# Patient Record
Sex: MALE | Race: BLACK OR AFRICAN AMERICAN | NOT HISPANIC OR LATINO | URBAN - METROPOLITAN AREA
[De-identification: names, ages, dates, MRNs, and addresses within clinical notes are randomized per-mention and may not be internally consistent; named-entity substitution may affect disease eponyms.]

---

## 2018-05-04 ENCOUNTER — EMERGENCY (EMERGENCY)
Facility: HOSPITAL | Age: 68
LOS: 1 days | End: 2018-05-04
Attending: EMERGENCY MEDICINE | Admitting: EMERGENCY MEDICINE
Payer: MEDICARE

## 2018-05-04 VITALS
SYSTOLIC BLOOD PRESSURE: 143 MMHG | OXYGEN SATURATION: 99 % | TEMPERATURE: 98 F | RESPIRATION RATE: 17 BRPM | HEART RATE: 77 BPM | WEIGHT: 160.06 LBS | DIASTOLIC BLOOD PRESSURE: 80 MMHG | HEIGHT: 73 IN

## 2018-05-04 DIAGNOSIS — Z98.890 OTHER SPECIFIED POSTPROCEDURAL STATES: Chronic | ICD-10-CM

## 2018-05-04 LAB
ALBUMIN SERPL ELPH-MCNC: 4.5 G/DL — SIGNIFICANT CHANGE UP (ref 3.3–5)
ALP SERPL-CCNC: 49 U/L — SIGNIFICANT CHANGE UP (ref 40–120)
ALT FLD-CCNC: 18 U/L — SIGNIFICANT CHANGE UP (ref 10–45)
ANION GAP SERPL CALC-SCNC: 13 MMOL/L — SIGNIFICANT CHANGE UP (ref 5–17)
AST SERPL-CCNC: 17 U/L — SIGNIFICANT CHANGE UP (ref 10–40)
BASOPHILS # BLD AUTO: 0.1 K/UL — SIGNIFICANT CHANGE UP (ref 0–0.2)
BASOPHILS NFR BLD AUTO: 1 % — SIGNIFICANT CHANGE UP (ref 0–2)
BILIRUB SERPL-MCNC: 0.6 MG/DL — SIGNIFICANT CHANGE UP (ref 0.2–1.2)
BUN SERPL-MCNC: 14 MG/DL — SIGNIFICANT CHANGE UP (ref 7–23)
CALCIUM SERPL-MCNC: 9.6 MG/DL — SIGNIFICANT CHANGE UP (ref 8.4–10.5)
CHLORIDE SERPL-SCNC: 101 MMOL/L — SIGNIFICANT CHANGE UP (ref 96–108)
CK MB BLD-MCNC: 1.1 % — SIGNIFICANT CHANGE UP (ref 0–3.5)
CK MB CFR SERPL CALC: 1.2 NG/ML — SIGNIFICANT CHANGE UP (ref 0–6.7)
CK SERPL-CCNC: 109 U/L — SIGNIFICANT CHANGE UP (ref 30–200)
CO2 SERPL-SCNC: 27 MMOL/L — SIGNIFICANT CHANGE UP (ref 22–31)
CREAT SERPL-MCNC: 0.98 MG/DL — SIGNIFICANT CHANGE UP (ref 0.5–1.3)
EOSINOPHIL # BLD AUTO: 0.3 K/UL — SIGNIFICANT CHANGE UP (ref 0–0.5)
EOSINOPHIL NFR BLD AUTO: 3.5 % — SIGNIFICANT CHANGE UP (ref 0–6)
GLUCOSE SERPL-MCNC: 90 MG/DL — SIGNIFICANT CHANGE UP (ref 70–99)
HCT VFR BLD CALC: 40.7 % — SIGNIFICANT CHANGE UP (ref 39–50)
HGB BLD-MCNC: 13.4 G/DL — SIGNIFICANT CHANGE UP (ref 13–17)
LYMPHOCYTES # BLD AUTO: 2.9 K/UL — SIGNIFICANT CHANGE UP (ref 1–3.3)
LYMPHOCYTES # BLD AUTO: 38.7 % — SIGNIFICANT CHANGE UP (ref 13–44)
MCHC RBC-ENTMCNC: 29 PG — SIGNIFICANT CHANGE UP (ref 27–34)
MCHC RBC-ENTMCNC: 33 GM/DL — SIGNIFICANT CHANGE UP (ref 32–36)
MCV RBC AUTO: 88 FL — SIGNIFICANT CHANGE UP (ref 80–100)
MONOCYTES # BLD AUTO: 0.5 K/UL — SIGNIFICANT CHANGE UP (ref 0–0.9)
MONOCYTES NFR BLD AUTO: 7.3 % — SIGNIFICANT CHANGE UP (ref 2–14)
NEUTROPHILS # BLD AUTO: 3.7 K/UL — SIGNIFICANT CHANGE UP (ref 1.8–7.4)
NEUTROPHILS NFR BLD AUTO: 49.5 % — SIGNIFICANT CHANGE UP (ref 43–77)
PLATELET # BLD AUTO: 213 K/UL — SIGNIFICANT CHANGE UP (ref 150–400)
POTASSIUM SERPL-MCNC: 3.9 MMOL/L — SIGNIFICANT CHANGE UP (ref 3.5–5.3)
POTASSIUM SERPL-SCNC: 3.9 MMOL/L — SIGNIFICANT CHANGE UP (ref 3.5–5.3)
PROT SERPL-MCNC: 7.9 G/DL — SIGNIFICANT CHANGE UP (ref 6–8.3)
RBC # BLD: 4.63 M/UL — SIGNIFICANT CHANGE UP (ref 4.2–5.8)
RBC # FLD: 12.5 % — SIGNIFICANT CHANGE UP (ref 10.3–14.5)
SODIUM SERPL-SCNC: 141 MMOL/L — SIGNIFICANT CHANGE UP (ref 135–145)
TROPONIN T SERPL-MCNC: <0.01 NG/ML — SIGNIFICANT CHANGE UP (ref 0–0.06)
TROPONIN T SERPL-MCNC: <0.01 NG/ML — SIGNIFICANT CHANGE UP (ref 0–0.06)
WBC # BLD: 7.4 K/UL — SIGNIFICANT CHANGE UP (ref 3.8–10.5)
WBC # FLD AUTO: 7.4 K/UL — SIGNIFICANT CHANGE UP (ref 3.8–10.5)

## 2018-05-04 PROCEDURE — 73564 X-RAY EXAM KNEE 4 OR MORE: CPT | Mod: 26,RT

## 2018-05-04 PROCEDURE — 70450 CT HEAD/BRAIN W/O DYE: CPT | Mod: 26

## 2018-05-04 PROCEDURE — 99220: CPT

## 2018-05-04 PROCEDURE — 93010 ELECTROCARDIOGRAM REPORT: CPT

## 2018-05-04 PROCEDURE — 71045 X-RAY EXAM CHEST 1 VIEW: CPT | Mod: 26

## 2018-05-04 NOTE — ED CDU PROVIDER INITIAL DAY NOTE - ATTENDING CONTRIBUTION TO CARE
Attending MD Busby:   I personally have seen and examined this patient.  Physician assistant note reviewed and agree on plan of care and except where noted.  See HPI, PE, and MDM for details.

## 2018-05-04 NOTE — ED PROVIDER NOTE - PROGRESS NOTE DETAILS
Endorsed to Dr Kehinde Stokes MD, Facep Attending MD Busby: CT head wnl, first CE negative. Will place in CDU for tele monitoring, TTE

## 2018-05-04 NOTE — ED PROVIDER NOTE - MUSCULOSKELETAL, MLM
Spine appears normal, range of motion is not limited.  Right knee tender to palpation. No large effusion noted.

## 2018-05-04 NOTE — ED PROVIDER NOTE - ATTENDING CONTRIBUTION TO CARE
Private Physician [facility]  67y male pmh borderline DM,GERD, HLD, No habits, Hypertension,cancer,travel, Sp hernia repair,. shoulder surg left. Retired [employer] worker comes to ed was sitting outside on warm day with onset of nausea and syncope. Had hx of syncope two years ago. Last strees test more than 10y ago. Pt struck occiput and knee when fell. Unwittnessed. now complains of pain head and rt knee pain. No vomiting. No fever chills cp/sob/palps/diplopia  PE WDWN male normocephalic, abrasoin forhead, neck neg by  nexus, cv no rubs, gallops or murmurs, neuro gcs 15 speech fluent power 5,.5 all extr pain light touch intact  German Stokes MD, Facep

## 2018-05-04 NOTE — ED PROVIDER NOTE - MEDICAL DECISION MAKING DETAILS
Syncopal episode in male.  Ddx include vasovagal episode, orthostasis, arrythmia.  Suspect vasovagal given prodrome.  Less likely arrythmia since no known cardiac disease.  Plan for CBC, CMP, CE.  Will do CT head since pt is >65 and on anticoagulant and check knee X ray to rule out fractures.

## 2018-05-04 NOTE — ED CDU PROVIDER INITIAL DAY NOTE - PROGRESS NOTE DETAILS
Pt comfortable. No complaints. Vital signs stable. Will continue to observe and reassess. -Dina Evans PA-C Received pt at signout at 1900 from [place] . Case/plan reviewed. second troponin negative, ekg NSR with no YAQUELIN or depressions. will continue to monitor.

## 2018-05-04 NOTE — ED ADULT TRIAGE NOTE - CHIEF COMPLAINT QUOTE
Pt states that he had a syncopal episode yesterday s/p " sitting in the sun". C/o r knee pain and head abrasions

## 2018-05-04 NOTE — ED ADULT NURSE NOTE - OBJECTIVE STATEMENT
67y m pt c/o syncope yesterday; was sitting on stoop in hot sun; not sure what had eaten earlier in the day except some ice cream; has had another syncopal episode a "while" ago; pt is non compliant with prescribed medications; pt states "doesn't like to take medication"; aox3; poor historian; no resp distress; no chest pain; no abd pain; no n/v/d; no fever/chills; no cough/congestion; no numbness/tingling; pt has small shira size wound to front of head and smaller above eyebrow; pt states fell 4 steps; and also hurt his right knee; states is painful and swollen; tender on palpation; iv placed; labs drawn per md orders; safety/comfort maintained

## 2018-05-04 NOTE — ED CDU PROVIDER INITIAL DAY NOTE - OBJECTIVE STATEMENT
67M hx pre-T2DM, HLD presenting with unwitnessed syncopal episode at home yesterday.  Pt was sitting on his porch steps yesterday when he began to feel lightheaded and nausea and then passed out.  He fell down 4 steps with impact to his head and his right knee.  Pt decided to delay coming to ER because 'he wanted to see how he felt' but came today due to headache and persistent knee pain.  He sustained an abrasion to the front of his scalp but no other injuries.  He is currently on ASA for primary prevention but says he is non-compliant with the med.  His last episode of syncope occurred 2 years ago after getting out of a truck with similar prodromal symptoms preceding the event. 67M hx pre-T2DM, HLD presenting with unwitnessed syncopal episode at home yesterday.  Pt was sitting on his porch steps yesterday when he began to feel lightheaded and nausea and then passed out.  He fell down 4 steps with impact to his head and his right knee.  Pt decided to delay coming to ER because 'he wanted to see how he felt' but came today due to headache and persistent knee pain.  He sustained an abrasion to the front of his scalp but no other injuries.  He is currently on ASA for primary prevention but says he is non-compliant with his medications.  His last episode of syncope occurred 2 years ago after getting out of a truck with similar prodromal symptoms preceding the event.

## 2018-05-04 NOTE — ED PROVIDER NOTE - OBJECTIVE STATEMENT
67M hx pre-T2DM, HLD presenting with unwitnessed syncopal episode at home yesterday.  Pt was sitting on his porch steps yesterday when he began to feel lightheaded and nausea and then passed out.  He fell down 4 steps with impact to his head and his right knee.  Pt decided to delay coming to ER because 'he wanted to see how he felt' but came today due to headache and persistent knee pain.  He sustained an abrasion to the front of his scalp but no other injuries.  He is currently on ASA for primary prevention but says he is non-compliant with the med.  His last episode of syncope occurred 2 years ago after getting out of a truck with similar prodromal symptoms preceding the event.

## 2018-05-05 VITALS
RESPIRATION RATE: 16 BRPM | TEMPERATURE: 98 F | OXYGEN SATURATION: 98 % | SYSTOLIC BLOOD PRESSURE: 130 MMHG | DIASTOLIC BLOOD PRESSURE: 73 MMHG | HEART RATE: 61 BPM

## 2018-05-05 LAB
CHOLEST SERPL-MCNC: 208 MG/DL — HIGH (ref 10–199)
HBA1C BLD-MCNC: 6.7 % — HIGH (ref 4–5.6)
HDLC SERPL-MCNC: 51 MG/DL — SIGNIFICANT CHANGE UP (ref 40–125)
LIPID PNL WITH DIRECT LDL SERPL: 139 MG/DL — HIGH
TOTAL CHOLESTEROL/HDL RATIO MEASUREMENT: 4.1 RATIO — SIGNIFICANT CHANGE UP (ref 3.4–9.6)
TRIGL SERPL-MCNC: 92 MG/DL — SIGNIFICANT CHANGE UP (ref 10–149)

## 2018-05-05 PROCEDURE — 73564 X-RAY EXAM KNEE 4 OR MORE: CPT

## 2018-05-05 PROCEDURE — 85027 COMPLETE CBC AUTOMATED: CPT

## 2018-05-05 PROCEDURE — 80053 COMPREHEN METABOLIC PANEL: CPT

## 2018-05-05 PROCEDURE — 82550 ASSAY OF CK (CPK): CPT

## 2018-05-05 PROCEDURE — 82962 GLUCOSE BLOOD TEST: CPT

## 2018-05-05 PROCEDURE — 93306 TTE W/DOPPLER COMPLETE: CPT | Mod: 26

## 2018-05-05 PROCEDURE — 83036 HEMOGLOBIN GLYCOSYLATED A1C: CPT

## 2018-05-05 PROCEDURE — 82553 CREATINE MB FRACTION: CPT

## 2018-05-05 PROCEDURE — 93005 ELECTROCARDIOGRAM TRACING: CPT

## 2018-05-05 PROCEDURE — 80061 LIPID PANEL: CPT

## 2018-05-05 PROCEDURE — 71045 X-RAY EXAM CHEST 1 VIEW: CPT

## 2018-05-05 PROCEDURE — 99217: CPT

## 2018-05-05 PROCEDURE — 93306 TTE W/DOPPLER COMPLETE: CPT

## 2018-05-05 PROCEDURE — 84484 ASSAY OF TROPONIN QUANT: CPT

## 2018-05-05 PROCEDURE — 70450 CT HEAD/BRAIN W/O DYE: CPT

## 2018-05-05 PROCEDURE — 99284 EMERGENCY DEPT VISIT MOD MDM: CPT | Mod: 25

## 2018-05-05 PROCEDURE — G0378: CPT

## 2018-05-05 NOTE — ED CDU PROVIDER DISPOSITION NOTE - ATTENDING CONTRIBUTION TO CARE
Attending MD Mercado:   I personally have seen and examined this patient.  Physician assistant note reviewed and agree on plan of care and except where noted.  See below for details.     Attending MD Mercado: I have personally performed a face to face diagnostic evaluation on this patient.  I have reviewed the ACP note and agree with the history, exam, and plan of care, except as noted.      67M with PMH including HLD, DM sent to the CDU for echo s/p syncopal episode while sitting on his porch and presenting with R knee pain.  Reports knee pain improved although not resolved.  Denies chest pain, shortness of breath, palpitations. Denies abdominal pain, nausea, vomiting, diarrhea, blood in stools. Denies loss of urinary or bowel continence. Denies numbness/weakness/tingling in extremities.  On exam, NAD, head NCAT, PERRL, FROM at neck, no tenderness to palpation or stepoffs along length of spine, lungs CTAB with good inspiratory effort, +S1S2, no m/r/g, abdomen soft with +BS, NT, ND, no CVAT, moving all extremities with 5/5 strength bilateral upper and lower extremities, good and equal  strength bilaterally, sensory grossly intact; A/P: 67M with syncopal episode, ECHO reviewed, no acute pathology noted.  No acute issues at  this time.  Lab and radiology tests reviewed with patient.  Patient stable for discharge. Follow up instructions given, importance of follow up emphasized, return to ED parameters reviewed and patient verbalized understanding.  All questions answered, all concerns addressed.

## 2018-05-05 NOTE — ED CDU PROVIDER SUBSEQUENT DAY NOTE - ATTENDING CONTRIBUTION TO CARE
Attending MD Mercado:   I personally have seen and examined this patient.  Physician assistant note reviewed and agree on plan of care and except where noted.  See 930am progress note for details.

## 2018-05-05 NOTE — ED CDU PROVIDER DISPOSITION NOTE - CLINICAL COURSE
67M hx pre-T2DM, HLD presenting with unwitnessed syncopal episode at home yesterday.  Pt was sitting on his porch steps yesterday when he began to feel lightheaded and nausea and then passed out.  He fell down 4 steps with impact to his head and his right knee. Patient denies chest pain/sob/fever/chills/cough. reports he had come to the ED for persistent knee pain although has been ambulating without difficulty. Patient was placed in the CDU for telemetry, trending of cardiac enzymes and echo. Overnight the patient had no events on tele, walked comfortably around the unit without symptoms and vitals remained stable. EKG remained stable and CE x 2 were negative. Echo revealed ________________________ 67M hx pre-T2DM, HLD presenting with unwitnessed syncopal episode at home yesterday.  Pt was sitting on his porch steps yesterday when he began to feel lightheaded and nausea and then passed out.  He fell down 4 steps with impact to his head and his right knee. Patient denies chest pain/sob/fever/chills/cough. reports he had come to the ED for persistent knee pain although has been ambulating without difficulty. Patient was placed in the CDU for telemetry, trending of cardiac enzymes and echo. Overnight the patient had no events on tele, walked comfortably around the unit without symptoms and vitals remained stable. EKG remained stable and CE x 2 were negative. Echo revealed some aortic calcifications that are nonobstructive, normal left ventricular function and an EF of 80%. Patient has been asymptomatic and after evaluation with Dr. Mercado at bedside patient was discharged stable home with cardiology clinic follow-up and return precautions.

## 2018-05-05 NOTE — ED CDU PROVIDER SUBSEQUENT DAY NOTE - HISTORY
Patient sleeping. NAD. No complaints. VSS. CE negative x 2. no events on tele. will continue to monitor

## 2018-05-05 NOTE — ED ADULT NURSE REASSESSMENT NOTE - NS ED NURSE REASSESS COMMENT FT1
report taken from Monalisa MERIDA. pt awaiting echo
Received pt from MAGNOLIA Nunez , received pt alert and responsive, oriented x4, denies any respiratory distress, SOB, or difficulty breathing. Pt transferred to CDU for  lightheadedness with syncopal episode and nausea. Pt is currently asymptomatic at this time, Pt denies pain/discomfort at this time. Pending 2nd CE with EKG and ECHO in AM.  On tele SR on monitor hr: 70's.  IV in place, patent and free of signs of infiltration, pt denies chest pain or palpitations, V/S stable, pt afebrile, pt denies pain at this time. Pt educated on unit and unit rules, instructed patient to notify RN of any needed assistance, Pt verbalizes understanding, Call bell placed within reach. Safety maintained. Will continue to monitor.

## 2018-05-05 NOTE — ED CDU PROVIDER DISPOSITION NOTE - PLAN OF CARE
1. Rest. Hydrate. Continue medications as prescribed.   2. Follow up with your PMD in 2-3 days with copies of your results. You should also follow up with cardiology [phone]  3. Return to ER for new or worsened chest pain, difficulty breathing, palpitations, dizziness, drenching sweats, fever, passing out or any concern.  Rest your knee. Apply cold pack for 20 minutes multiple times daily. Elevate. Take Motrin 400-600mg every 6 hrs as needed for pain. Take with food  you can also Take Tylenol 650mg every 6 hrs as needed for pain.

## 2019-02-08 ENCOUNTER — EMERGENCY (EMERGENCY)
Facility: HOSPITAL | Age: 69
LOS: 1 days | Discharge: HOME | End: 2019-02-08
Attending: EMERGENCY MEDICINE
Payer: MEDICARE

## 2019-02-08 VITALS
TEMPERATURE: 98 F | HEART RATE: 82 BPM | SYSTOLIC BLOOD PRESSURE: 149 MMHG | HEIGHT: 70 IN | DIASTOLIC BLOOD PRESSURE: 79 MMHG | OXYGEN SATURATION: 98 % | WEIGHT: 169.98 LBS | RESPIRATION RATE: 18 BRPM

## 2019-02-08 VITALS
SYSTOLIC BLOOD PRESSURE: 133 MMHG | HEART RATE: 72 BPM | RESPIRATION RATE: 18 BRPM | TEMPERATURE: 98 F | OXYGEN SATURATION: 98 % | DIASTOLIC BLOOD PRESSURE: 84 MMHG

## 2019-02-08 DIAGNOSIS — Z98.890 OTHER SPECIFIED POSTPROCEDURAL STATES: Chronic | ICD-10-CM

## 2019-02-08 LAB
ALBUMIN SERPL ELPH-MCNC: 4.3 G/DL — SIGNIFICANT CHANGE UP (ref 3.3–5)
ALP SERPL-CCNC: 46 U/L — SIGNIFICANT CHANGE UP (ref 40–120)
ALT FLD-CCNC: 16 U/L — SIGNIFICANT CHANGE UP (ref 10–45)
ANION GAP SERPL CALC-SCNC: 12 MMOL/L — SIGNIFICANT CHANGE UP (ref 5–17)
AST SERPL-CCNC: 12 U/L — SIGNIFICANT CHANGE UP (ref 10–40)
BASOPHILS # BLD AUTO: 0 K/UL — SIGNIFICANT CHANGE UP (ref 0–0.2)
BASOPHILS NFR BLD AUTO: 0.8 % — SIGNIFICANT CHANGE UP (ref 0–2)
BILIRUB SERPL-MCNC: 0.4 MG/DL — SIGNIFICANT CHANGE UP (ref 0.2–1.2)
BUN SERPL-MCNC: 9 MG/DL — SIGNIFICANT CHANGE UP (ref 7–23)
CALCIUM SERPL-MCNC: 9.1 MG/DL — SIGNIFICANT CHANGE UP (ref 8.4–10.5)
CHLORIDE SERPL-SCNC: 99 MMOL/L — SIGNIFICANT CHANGE UP (ref 96–108)
CK SERPL-CCNC: 105 U/L — SIGNIFICANT CHANGE UP (ref 30–200)
CO2 SERPL-SCNC: 26 MMOL/L — SIGNIFICANT CHANGE UP (ref 22–31)
CREAT SERPL-MCNC: 0.96 MG/DL — SIGNIFICANT CHANGE UP (ref 0.5–1.3)
D DIMER BLD IA.RAPID-MCNC: 157 NG/ML DDU — SIGNIFICANT CHANGE UP
EOSINOPHIL # BLD AUTO: 0.3 K/UL — SIGNIFICANT CHANGE UP (ref 0–0.5)
EOSINOPHIL NFR BLD AUTO: 5 % — SIGNIFICANT CHANGE UP (ref 0–6)
GLUCOSE SERPL-MCNC: 91 MG/DL — SIGNIFICANT CHANGE UP (ref 70–99)
HCT VFR BLD CALC: 38.4 % — LOW (ref 39–50)
HGB BLD-MCNC: 13.5 G/DL — SIGNIFICANT CHANGE UP (ref 13–17)
LYMPHOCYTES # BLD AUTO: 2.1 K/UL — SIGNIFICANT CHANGE UP (ref 1–3.3)
LYMPHOCYTES # BLD AUTO: 33.1 % — SIGNIFICANT CHANGE UP (ref 13–44)
MCHC RBC-ENTMCNC: 30.1 PG — SIGNIFICANT CHANGE UP (ref 27–34)
MCHC RBC-ENTMCNC: 35.2 GM/DL — SIGNIFICANT CHANGE UP (ref 32–36)
MCV RBC AUTO: 85.5 FL — SIGNIFICANT CHANGE UP (ref 80–100)
MONOCYTES # BLD AUTO: 0.7 K/UL — SIGNIFICANT CHANGE UP (ref 0–0.9)
MONOCYTES NFR BLD AUTO: 10.4 % — SIGNIFICANT CHANGE UP (ref 2–14)
NEUTROPHILS # BLD AUTO: 3.2 K/UL — SIGNIFICANT CHANGE UP (ref 1.8–7.4)
NEUTROPHILS NFR BLD AUTO: 50.8 % — SIGNIFICANT CHANGE UP (ref 43–77)
NT-PROBNP SERPL-SCNC: 90 PG/ML — SIGNIFICANT CHANGE UP (ref 0–300)
PLATELET # BLD AUTO: 218 K/UL — SIGNIFICANT CHANGE UP (ref 150–400)
POTASSIUM SERPL-MCNC: 4.1 MMOL/L — SIGNIFICANT CHANGE UP (ref 3.5–5.3)
POTASSIUM SERPL-SCNC: 4.1 MMOL/L — SIGNIFICANT CHANGE UP (ref 3.5–5.3)
PROT SERPL-MCNC: 7.5 G/DL — SIGNIFICANT CHANGE UP (ref 6–8.3)
RAPID RVP RESULT: SIGNIFICANT CHANGE UP
RBC # BLD: 4.49 M/UL — SIGNIFICANT CHANGE UP (ref 4.2–5.8)
RBC # FLD: 12.7 % — SIGNIFICANT CHANGE UP (ref 10.3–14.5)
SODIUM SERPL-SCNC: 137 MMOL/L — SIGNIFICANT CHANGE UP (ref 135–145)
TROPONIN T, HIGH SENSITIVITY RESULT: <6 NG/L — SIGNIFICANT CHANGE UP (ref 0–51)
WBC # BLD: 6.3 K/UL — SIGNIFICANT CHANGE UP (ref 3.8–10.5)
WBC # FLD AUTO: 6.3 K/UL — SIGNIFICANT CHANGE UP (ref 3.8–10.5)

## 2019-02-08 PROCEDURE — 93010 ELECTROCARDIOGRAM REPORT: CPT

## 2019-02-08 PROCEDURE — 99284 EMERGENCY DEPT VISIT MOD MDM: CPT | Mod: 25

## 2019-02-08 PROCEDURE — 87486 CHLMYD PNEUM DNA AMP PROBE: CPT

## 2019-02-08 PROCEDURE — 99285 EMERGENCY DEPT VISIT HI MDM: CPT | Mod: 25

## 2019-02-08 PROCEDURE — 71046 X-RAY EXAM CHEST 2 VIEWS: CPT

## 2019-02-08 PROCEDURE — 80053 COMPREHEN METABOLIC PANEL: CPT

## 2019-02-08 PROCEDURE — 83880 ASSAY OF NATRIURETIC PEPTIDE: CPT

## 2019-02-08 PROCEDURE — 84484 ASSAY OF TROPONIN QUANT: CPT

## 2019-02-08 PROCEDURE — 87581 M.PNEUMON DNA AMP PROBE: CPT

## 2019-02-08 PROCEDURE — 71046 X-RAY EXAM CHEST 2 VIEWS: CPT | Mod: 26

## 2019-02-08 PROCEDURE — 87798 DETECT AGENT NOS DNA AMP: CPT

## 2019-02-08 PROCEDURE — 93005 ELECTROCARDIOGRAM TRACING: CPT

## 2019-02-08 PROCEDURE — 87633 RESP VIRUS 12-25 TARGETS: CPT

## 2019-02-08 PROCEDURE — 82550 ASSAY OF CK (CPK): CPT

## 2019-02-08 PROCEDURE — 85379 FIBRIN DEGRADATION QUANT: CPT

## 2019-02-08 PROCEDURE — 85027 COMPLETE CBC AUTOMATED: CPT

## 2019-02-08 RX ORDER — ASPIRIN/CALCIUM CARB/MAGNESIUM 324 MG
324 TABLET ORAL DAILY
Qty: 0 | Refills: 0 | Status: DISCONTINUED | OUTPATIENT
Start: 2019-02-08 | End: 2019-02-12

## 2019-02-08 RX ADMIN — Medication 324 MILLIGRAM(S): at 18:08

## 2019-02-08 NOTE — ED PROVIDER NOTE - NSFOLLOWUPCLINICS_GEN_ALL_ED_FT
[facility] General Internal Medicine  General Internal Medicine  [address] [id number]  Phone: [phone]  Fax:   Follow Up Time: 1-3 Days

## 2019-02-08 NOTE — ED ADULT NURSE NOTE - OBJECTIVE STATEMENT
69 y/o male presents to ed c/o chest pain for the past 3 days that radiates to his left arm. Pain is worse with inspiration. Also c/o nasal congestion and none productive dry cough. Denies  ha, n/v/d, abdominal pain, f/c, urinary symptoms, hematuria. A&Ox4, vss, skin warm dry and intact, MAEx4, lungs CTA, abd soft nondistended. EKG shows NSR, placed on CM. Pt resting comfortably with VSS, no complaints at this time. Patient's bed in the lowest position, explained plan of care to patient and family members. Will continue to reassess.

## 2019-02-08 NOTE — ED PROVIDER NOTE - OBJECTIVE STATEMENT
67 yo M former smoker with pmhx hld, gerd and borderline DM presenting with chest pain x three days. Patient states he has been having constant left sided chest pain that radiates to his LUE. Patient states pain is worse with deep breathing and movement. patient hasn't tried anything for the pain. States pain is 8/10. [facility] and hasn't seen a cardiologist in years. Patient admits to productive cough for one week and nasal congestion. Patient denies recent travel, fever, sob, ha, neck pain, back pain, tingling, numbness, weakness, calf pain, edema, abd pain, nvd, dysuria and hematuria 67 yo M former smoker with pmhx hld, gerd and borderline DM presenting with chest pain x three days. Patient states he has been having constant left sided chest pain that radiates to his LUE. Patient states pain is worse with deep breathing and lifting. patient hasn't tried anything for the pain. States pain is 8/10. [facility] and hasn't seen a cardiologist in years. Patient admits to productive cough for one week and nasal congestion. Patient denies recent travel, fever, sob, ha, neck pain, back pain, tingling, numbness, weakness, calf pain, edema, abd pain, nvd, dysuria and hematuria

## 2019-02-08 NOTE — ED PROVIDER NOTE - NSFOLLOWUPINSTRUCTIONS_ED_ALL_ED_FT
rest and hydration  tylenol 650mg every 6 hours for pain as needed  Follow up with primary doctor in 1-2 days or NS clinic  return to the ER immediately for worsening symptoms

## 2019-02-08 NOTE — ED PROVIDER NOTE - ENMT, MLM
Airway patent, congested nasal mucosa. Mouth with normal mucosa. Throat has no vesicles, no oropharyngeal exudates and uvula is midline.

## 2019-02-08 NOTE — ED PROVIDER NOTE - ATTENDING CONTRIBUTION TO CARE
attending Krupa: 68YM h/o diet-controlled DM p/w chest pain x 3 days. Pleuritic and worse with movement. Radiating to L arm. +cough with yellow phlegm. No hemoptysis, rash, fever/chills, sick contacts, recent travel, h/o DVT or PE, LE edema, orthopnea. Last stress test few years ago, normal. On exam, VSS, well-appearing, chest wall nontender, no rashes, equal radial pulses, lungs clear, S1S2 normal, abdomen soft/NT, no calf tenderness or LE edema. Will obtain ekg, place on tele, labs including trop and d-dimer, ASA, cxr and reassess. attending Krupa: 68YM former smoker, h/o diet-controlled DM, GERD p/w chest pain x 3 days. Pleuritic and worse with movement. Radiating to L arm. +cough with yellow phlegm. No hemoptysis, rash, fever/chills, sick contacts, recent travel, h/o DVT or PE, LE edema, orthopnea. Last stress test few years ago, normal. On exam, VSS, well-appearing, chest wall nontender, no rashes, equal radial pulses, lungs clear, S1S2 normal, abdomen soft/NT, no calf tenderness or LE edema. Will obtain ekg, place on tele, labs including trop and d-dimer, ASA, cxr and reassess.

## 2019-02-08 NOTE — ED PROVIDER NOTE - CHPI ED SYMPTOMS NEG
no dizziness/no nausea/no fever/no syncope/no shortness of breath/no diaphoresis/no back pain/no vomiting

## 2019-02-09 PROBLEM — R73.03 PREDIABETES: Chronic | Status: ACTIVE | Noted: 2018-05-04

## 2019-02-09 PROBLEM — K21.9 GASTRO-ESOPHAGEAL REFLUX DISEASE WITHOUT ESOPHAGITIS: Chronic | Status: ACTIVE | Noted: 2018-05-04

## 2019-02-09 PROBLEM — E78.5 HYPERLIPIDEMIA, UNSPECIFIED: Chronic | Status: ACTIVE | Noted: 2018-05-04

## 2019-12-04 NOTE — ED PROVIDER NOTE - MEDICAL DECISION MAKING DETAILS
DISCONTINUE ON PATHWAY REGIMEN - Gastroesophageal    No Medical Intervention - Off Treatment.    PRIOR TREATMENT: Off Treatment    Gastroesophageal - No Medical Intervention - Off Treatment.    Patient Characteristics:  Gastric, Adenocarcinoma, Preoperative or Nonsurgical Candidate (Clinical   Staging), cT1 - cT2, cN0, Surgical Candidate  Histology: Adenocarcinoma  Disease Classification: Gastric  Therapeutic Status: Preoperative or Nonsurgical Candidate (Clinical Staging)  AJCC N Category: cN0  AJCC M Category: cM0  AJCC 8 Stage Grouping: I  AJCC T Category: cT2  Patient Characteristics: Surgical Candidate   69 yo M former smoker with pmhx hld, gerd and borderline DM presenting with chest pain x three days. PE unremarkable. EKG NSR. Will obtain cardiac monitoring, labs, and cxr. will give asa and reasses

## 2021-05-28 ENCOUNTER — EMERGENCY (EMERGENCY)
Facility: HOSPITAL | Age: 71
LOS: 1 days | Discharge: HOME | End: 2021-05-28
Payer: MEDICARE

## 2021-05-28 VITALS
SYSTOLIC BLOOD PRESSURE: 147 MMHG | HEIGHT: 70 IN | TEMPERATURE: 98 F | HEART RATE: 76 BPM | OXYGEN SATURATION: 96 % | WEIGHT: 149.03 LBS | DIASTOLIC BLOOD PRESSURE: 62 MMHG | RESPIRATION RATE: 18 BRPM

## 2021-05-28 VITALS
TEMPERATURE: 98 F | RESPIRATION RATE: 17 BRPM | DIASTOLIC BLOOD PRESSURE: 69 MMHG | SYSTOLIC BLOOD PRESSURE: 151 MMHG | OXYGEN SATURATION: 99 % | HEART RATE: 67 BPM

## 2021-05-28 DIAGNOSIS — Z98.890 OTHER SPECIFIED POSTPROCEDURAL STATES: Chronic | ICD-10-CM

## 2021-05-28 LAB
A1C WITH ESTIMATED AVERAGE GLUCOSE RESULT: 6.6 % — HIGH (ref 4–5.6)
ALBUMIN SERPL ELPH-MCNC: 4.3 G/DL — SIGNIFICANT CHANGE UP (ref 3.3–5)
ALP SERPL-CCNC: 53 U/L — SIGNIFICANT CHANGE UP (ref 40–120)
ALT FLD-CCNC: 24 U/L — SIGNIFICANT CHANGE UP (ref 10–45)
ANION GAP SERPL CALC-SCNC: 12 MMOL/L — SIGNIFICANT CHANGE UP (ref 5–17)
APTT BLD: 27 SEC — LOW (ref 27.5–35.5)
AST SERPL-CCNC: 19 U/L — SIGNIFICANT CHANGE UP (ref 10–40)
BASOPHILS # BLD AUTO: 0.08 K/UL — SIGNIFICANT CHANGE UP (ref 0–0.2)
BASOPHILS NFR BLD AUTO: 1.2 % — SIGNIFICANT CHANGE UP (ref 0–2)
BILIRUB SERPL-MCNC: 0.4 MG/DL — SIGNIFICANT CHANGE UP (ref 0.2–1.2)
BUN SERPL-MCNC: 11 MG/DL — SIGNIFICANT CHANGE UP (ref 7–23)
CALCIUM SERPL-MCNC: 9.5 MG/DL — SIGNIFICANT CHANGE UP (ref 8.4–10.5)
CHLORIDE SERPL-SCNC: 100 MMOL/L — SIGNIFICANT CHANGE UP (ref 96–108)
CO2 SERPL-SCNC: 25 MMOL/L — SIGNIFICANT CHANGE UP (ref 22–31)
CREAT SERPL-MCNC: 0.91 MG/DL — SIGNIFICANT CHANGE UP (ref 0.5–1.3)
EOSINOPHIL # BLD AUTO: 0.66 K/UL — HIGH (ref 0–0.5)
EOSINOPHIL NFR BLD AUTO: 9.7 % — HIGH (ref 0–6)
ESTIMATED AVERAGE GLUCOSE: 143 MG/DL — HIGH (ref 68–114)
GLUCOSE SERPL-MCNC: 89 MG/DL — SIGNIFICANT CHANGE UP (ref 70–99)
HCT VFR BLD CALC: 41.3 % — SIGNIFICANT CHANGE UP (ref 39–50)
HGB BLD-MCNC: 12.8 G/DL — LOW (ref 13–17)
IMM GRANULOCYTES NFR BLD AUTO: 0.3 % — SIGNIFICANT CHANGE UP (ref 0–1.5)
INR BLD: 1.09 RATIO — SIGNIFICANT CHANGE UP (ref 0.88–1.16)
LIDOCAIN IGE QN: 24 U/L — SIGNIFICANT CHANGE UP (ref 7–60)
LYMPHOCYTES # BLD AUTO: 2.99 K/UL — SIGNIFICANT CHANGE UP (ref 1–3.3)
LYMPHOCYTES # BLD AUTO: 44.1 % — HIGH (ref 13–44)
MCHC RBC-ENTMCNC: 27.1 PG — SIGNIFICANT CHANGE UP (ref 27–34)
MCHC RBC-ENTMCNC: 31 GM/DL — LOW (ref 32–36)
MCV RBC AUTO: 87.5 FL — SIGNIFICANT CHANGE UP (ref 80–100)
MONOCYTES # BLD AUTO: 0.59 K/UL — SIGNIFICANT CHANGE UP (ref 0–0.9)
MONOCYTES NFR BLD AUTO: 8.7 % — SIGNIFICANT CHANGE UP (ref 2–14)
NEUTROPHILS # BLD AUTO: 2.44 K/UL — SIGNIFICANT CHANGE UP (ref 1.8–7.4)
NEUTROPHILS NFR BLD AUTO: 36 % — LOW (ref 43–77)
NRBC # BLD: 0 /100 WBCS — SIGNIFICANT CHANGE UP (ref 0–0)
PLATELET # BLD AUTO: 229 K/UL — SIGNIFICANT CHANGE UP (ref 150–400)
POTASSIUM SERPL-MCNC: 4.2 MMOL/L — SIGNIFICANT CHANGE UP (ref 3.5–5.3)
POTASSIUM SERPL-SCNC: 4.2 MMOL/L — SIGNIFICANT CHANGE UP (ref 3.5–5.3)
PROT SERPL-MCNC: 8 G/DL — SIGNIFICANT CHANGE UP (ref 6–8.3)
PROTHROM AB SERPL-ACNC: 13 SEC — SIGNIFICANT CHANGE UP (ref 10.6–13.6)
RBC # BLD: 4.72 M/UL — SIGNIFICANT CHANGE UP (ref 4.2–5.8)
RBC # FLD: 13.9 % — SIGNIFICANT CHANGE UP (ref 10.3–14.5)
SODIUM SERPL-SCNC: 137 MMOL/L — SIGNIFICANT CHANGE UP (ref 135–145)
TROPONIN T, HIGH SENSITIVITY RESULT: 15 NG/L — SIGNIFICANT CHANGE UP (ref 0–51)
TROPONIN T, HIGH SENSITIVITY RESULT: 15 NG/L — SIGNIFICANT CHANGE UP (ref 0–51)
TROPONIN T, HIGH SENSITIVITY RESULT: 16 NG/L — SIGNIFICANT CHANGE UP (ref 0–51)
WBC # BLD: 6.78 K/UL — SIGNIFICANT CHANGE UP (ref 3.8–10.5)
WBC # FLD AUTO: 6.78 K/UL — SIGNIFICANT CHANGE UP (ref 3.8–10.5)

## 2021-05-28 PROCEDURE — 83036 HEMOGLOBIN GLYCOSYLATED A1C: CPT

## 2021-05-28 PROCEDURE — 99284 EMERGENCY DEPT VISIT MOD MDM: CPT | Mod: 25

## 2021-05-28 PROCEDURE — 83690 ASSAY OF LIPASE: CPT

## 2021-05-28 PROCEDURE — 85610 PROTHROMBIN TIME: CPT

## 2021-05-28 PROCEDURE — 80053 COMPREHEN METABOLIC PANEL: CPT

## 2021-05-28 PROCEDURE — 71046 X-RAY EXAM CHEST 2 VIEWS: CPT | Mod: 26

## 2021-05-28 PROCEDURE — 99285 EMERGENCY DEPT VISIT HI MDM: CPT | Mod: GC

## 2021-05-28 PROCEDURE — 93005 ELECTROCARDIOGRAM TRACING: CPT

## 2021-05-28 PROCEDURE — 96374 THER/PROPH/DIAG INJ IV PUSH: CPT

## 2021-05-28 PROCEDURE — 85025 COMPLETE CBC W/AUTO DIFF WBC: CPT

## 2021-05-28 PROCEDURE — 71046 X-RAY EXAM CHEST 2 VIEWS: CPT

## 2021-05-28 PROCEDURE — 93010 ELECTROCARDIOGRAM REPORT: CPT | Mod: GC

## 2021-05-28 PROCEDURE — 84484 ASSAY OF TROPONIN QUANT: CPT

## 2021-05-28 PROCEDURE — 85730 THROMBOPLASTIN TIME PARTIAL: CPT

## 2021-05-28 RX ORDER — ACETAMINOPHEN 500 MG
975 TABLET ORAL ONCE
Refills: 0 | Status: COMPLETED | OUTPATIENT
Start: 2021-05-28 | End: 2021-05-28

## 2021-05-28 RX ORDER — FAMOTIDINE 10 MG/ML
20 INJECTION INTRAVENOUS ONCE
Refills: 0 | Status: COMPLETED | OUTPATIENT
Start: 2021-05-28 | End: 2021-05-28

## 2021-05-28 RX ORDER — LIDOCAINE 4 G/100G
1 CREAM TOPICAL ONCE
Refills: 0 | Status: COMPLETED | OUTPATIENT
Start: 2021-05-28 | End: 2021-05-28

## 2021-05-28 RX ADMIN — Medication 30 MILLILITER(S): at 17:22

## 2021-05-28 RX ADMIN — Medication 975 MILLIGRAM(S): at 17:23

## 2021-05-28 RX ADMIN — FAMOTIDINE 20 MILLIGRAM(S): 10 INJECTION INTRAVENOUS at 17:22

## 2021-05-28 RX ADMIN — LIDOCAINE 1 PATCH: 4 CREAM TOPICAL at 17:23

## 2021-05-28 NOTE — ED PROVIDER NOTE - OBJECTIVE STATEMENT
69 y/o M w/ hx hld gerd dm presents with x1 day worsening sharp chest pain radiating to shoulder after lifting some heavy objects, mostly resolved, stress/echo in the remote past which was negative, no cardiology f/u, also having some epigastric discomfort associated with food.

## 2021-05-28 NOTE — ED PROVIDER NOTE - ATTENDING CONTRIBUTION TO CARE
MD Stewart:  patient seen and evaluated with the resident.  I was present for key portions of the History & Physical, and I agree with the Impression & Plan.  MD Stewart:  70 M c/o chest pain.  Duration:  XXX.    Quality:  XXX.    Associated Sx:  No back pain.  No weakness/numbness, no abdominal pain, & no fever/chills.    VS: XXX wnl.  Physical Exam: adult XXXX, NAD, NCAT, PERRL, EOMI, neck supple, CTA B, RRR, Abd: s/nd/nt, Ext: no edema.  Neuro:  AAOx3, moving all 4 extremities equally, normal gait.     ECG:    Impression:  Adult XXX with XXX cardiac RFs and description of CP that warrants concern for ACS.  H&P at this time inconsistent with an alternative etiology such as pericarditis, myocarditis, pulmonary embolism, pneumothorax, pneumonia, zoster, or esophageal perforation.  History not abrupt in onset, tearing or ripping; pulses symmetric; no evidence of aortic dissection.  Neither CTA chest, nor ddimer indicated at this time. MD Stewart:  patient seen and evaluated with the resident.  I was present for key portions of the History & Physical, and I agree with the Impression & Plan.  MD Stewart:  70 M c/o chest pain.  Duration:  10 seconds  Quality:  sharp; like a pin in chest  Location:  L pectoralis area  Context:  onset while lifting heavy groceries at 1pm  Associated Sx:  No SOB, no back pain.  No weakness/numbness, no abdominal pain, & no fever/chills.    VS: erma rate, othwerise wnl.  Physical Exam: adult M, NAD, NCAT, PERRL, EOMI, neck supple, CTA B, RRR, Abd: s/nd/nt, Ext: no edema.  Neuro:  AAOx3, moving all 4 extremities equally, normal gait.     ECG:  new bigeminy, large T-waves.  Impression:  Adult M with cardiac RFs (age, HTN, HLD, c/o of atypical L-sided CP that warrants consideration of ACS.  H&P at this time inconsistent with an alternative etiology such as pericarditis, myocarditis, pulmonary embolism, pneumothorax, pneumonia, zoster, or esophageal perforation.  History not abrupt in onset, tearing or ripping; pulses symmetric; no evidence of aortic dissection.  Neither CTA chest, nor ddimer indicated at this time.  Plan:  basic labs, trop, cxr, reassess.

## 2021-05-28 NOTE — ED PROVIDER NOTE - CLINICAL SUMMARY MEDICAL DECISION MAKING FREE TEXT BOX
Impression:  Adult M with cardiac RFs (age, HTN, HLD, c/o of atypical L-sided CP that warrants consideration of ACS.  H&P at this time inconsistent with an alternative etiology such as pericarditis, myocarditis, pulmonary embolism, pneumothorax, pneumonia, zoster, or esophageal perforation.  History not abrupt in onset, tearing or ripping; pulses symmetric; no evidence of aortic dissection.  Neither CTA chest, nor ddimer indicated at this time.  Plan:  basic labs, trop, cxr, reassess.

## 2021-05-28 NOTE — ED PROVIDER NOTE - PROGRESS NOTE DETAILS
Yina Leung PGY3  labs neg, pt has improved sx, will dc with pmd f/u MD Stewart:  HST stable x3.  Spoke with patient extensively regarding current differential diagnosis for ongoing symptoms, and patient acknowledged understanding. All questions and concerns have been addressed with the patient. I have discussed the plan for care and patient is in agreement. Patient is instructed to follow up with Primary Care Provider, and has been given strict return precautions.

## 2021-05-28 NOTE — ED PROVIDER NOTE - PHYSICAL EXAMINATION
[Const] well-appearing, resting comfortably, no acute distress  [HEENT] PERRL, EOMI, moist mucus membranes  [Neck] Supple, trachea midline  [CV] +S1/S2, no m/r/g appreciated  [Lungs] Clear to auscultations bilaterally, no adventitious lung sounds  [Abd] minimal epigastric TTP without rebound/guarding or peritoneal signs  [MSK] 5/5 upper extremity and lower extremity str bilaterally  [Skin] warm, dry, well-perfused  [Neuro] A&Ox3

## 2021-05-28 NOTE — ED ADULT NURSE NOTE - OBJECTIVE STATEMENT
69 yo male with a PMH of HLD and DM presents to the ED via waiting room from home complaining of sudden onset of chest pain. Patient states that he was lifting a grocery bag when he had pinching, non-radiating L sided chest pain. Episode lasted about 10 seconds, but still was nervous and came to the ER to be evaluated. Patient denies any associated diaphoresis with episode or n/v/d. Denies experiencing previous episode like this. Patient is placed on cardiac monitor, appears well at this time. No other complaints. MD Stewart at bedside evaluating patient. Denies headache, dizziness, vision changes, shortness of breath, abdominal pain, nausea, vomiting, diarrhea, fevers, chills, dysuria, hematuria, recent illness travel or fall.

## 2021-05-28 NOTE — ED PROVIDER NOTE - PATIENT PORTAL LINK FT
You can access the FollowMyHealth Patient Portal offered by [facility] by registering at the following website: [email or web address]. By joining [facility]’s FollowMyHealth portal, you will also be able to view your health information using other applications (apps) compatible with our system.

## 2021-05-28 NOTE — ED ADULT NURSE NOTE - CAS ELECT INFOMATION PROVIDED
Patient d/c home w/ written and verbal instructions. Pt verbalized understanding. IV d/c - No redness or swelling./DC instructions

## 2022-01-21 NOTE — ED PROVIDER NOTE - CHILD ABUSE FACILITY
[facility] V-Y Plasty Text: The defect edges were debeveled with a #15 scalpel blade.  Given the location of the defect, shape of the defect and the proximity to free margins an V-Y advancement flap was deemed most appropriate.  Using a sterile surgical marker, an appropriate advancement flap was drawn incorporating the defect and placing the expected incisions within the relaxed skin tension lines where possible.    The area thus outlined was incised deep to adipose tissue with a #15 scalpel blade.  The skin margins were undermined to an appropriate distance in all directions utilizing iris scissors.

## 2022-06-03 NOTE — ED ADULT TRIAGE NOTE - MEANS OF ARRIVAL
ambulatory
Pt from home by EMS for generalized weakness. Pt recent dx of breast CA s/p left sided lumpectomy 3 weeks ago first chemo treatment last week. Pt denies fevers, nausea, cp.

## 2024-05-17 NOTE — ED ADULT NURSE NOTE - NSIMPLEMENTINTERV_GEN_ALL_ED
yes
Implemented All Universal Safety Interventions:  [place] to call system. Call bell, personal items and telephone within reach. Instruct patient to call for assistance. Room bathroom lighting operational. Non-slip footwear when patient is off stretcher. Physically safe environment: no spills, clutter or unnecessary equipment. Stretcher in lowest position, wheels locked, appropriate side rails in place.